# Patient Record
Sex: MALE | Race: BLACK OR AFRICAN AMERICAN | Employment: FULL TIME | URBAN - METROPOLITAN AREA
[De-identification: names, ages, dates, MRNs, and addresses within clinical notes are randomized per-mention and may not be internally consistent; named-entity substitution may affect disease eponyms.]

---

## 2017-06-13 ENCOUNTER — APPOINTMENT (OUTPATIENT)
Dept: ULTRASOUND IMAGING | Facility: HOSPITAL | Age: 32
End: 2017-06-13
Attending: EMERGENCY MEDICINE
Payer: MEDICAID

## 2017-06-13 ENCOUNTER — APPOINTMENT (OUTPATIENT)
Dept: CT IMAGING | Facility: HOSPITAL | Age: 32
End: 2017-06-13
Attending: EMERGENCY MEDICINE
Payer: MEDICAID

## 2017-06-13 ENCOUNTER — HOSPITAL ENCOUNTER (EMERGENCY)
Facility: HOSPITAL | Age: 32
Discharge: HOME | End: 2017-06-13
Attending: EMERGENCY MEDICINE
Payer: MEDICAID

## 2017-06-13 VITALS
TEMPERATURE: 98 F | SYSTOLIC BLOOD PRESSURE: 130 MMHG | HEIGHT: 72 IN | RESPIRATION RATE: 16 BRPM | DIASTOLIC BLOOD PRESSURE: 78 MMHG | OXYGEN SATURATION: 98 % | BODY MASS INDEX: 28.44 KG/M2 | WEIGHT: 210 LBS | HEART RATE: 85 BPM

## 2017-06-13 DIAGNOSIS — R20.0 NUMBNESS AND TINGLING: ICD-10-CM

## 2017-06-13 DIAGNOSIS — M79.661 RIGHT CALF PAIN: Primary | ICD-10-CM

## 2017-06-13 DIAGNOSIS — R20.2 NUMBNESS AND TINGLING: ICD-10-CM

## 2017-06-13 PROCEDURE — 99284 EMERGENCY DEPT VISIT MOD MDM: CPT

## 2017-06-13 PROCEDURE — 70450 CT HEAD/BRAIN W/O DYE: CPT | Performed by: EMERGENCY MEDICINE

## 2017-06-13 PROCEDURE — 93971 EXTREMITY STUDY: CPT | Performed by: EMERGENCY MEDICINE

## 2017-06-13 NOTE — ED PROVIDER NOTES
Patient Seen in: [facility] Emergency Department    History   Patient presents with:  Numbness Weakness (neurologic)  Deep Vein Thrombosis (cardiovascular)    Stated Complaint: Intermittent cold/ numb to right foot.    right calf pain    HPI    This distress. The patient is in no respiratory distress. The patient is not septic or toxic    HEENT: Atraumatic, conjunctiva are not pale. There is no icterus. Oral mucosa Is wet. The neck is supple. There is no meningismus.     LUNGS: Clear to auscultati Prescribed:  There are no discharge medications for this patient.

## 2017-09-05 ENCOUNTER — HOSPITAL ENCOUNTER (EMERGENCY)
Facility: HOSPITAL | Age: 32
Discharge: HOME | End: 2017-09-05
Attending: EMERGENCY MEDICINE
Payer: COMMERCIAL

## 2017-09-05 VITALS
BODY MASS INDEX: 28.44 KG/M2 | HEART RATE: 65 BPM | HEIGHT: 72 IN | RESPIRATION RATE: 16 BRPM | TEMPERATURE: 98 F | WEIGHT: 210 LBS | OXYGEN SATURATION: 99 % | SYSTOLIC BLOOD PRESSURE: 138 MMHG | DIASTOLIC BLOOD PRESSURE: 71 MMHG

## 2017-09-05 DIAGNOSIS — E86.0 DEHYDRATION: ICD-10-CM

## 2017-09-05 DIAGNOSIS — M72.2 PLANTAR FASCIITIS: Primary | ICD-10-CM

## 2017-09-05 PROCEDURE — 99283 EMERGENCY DEPT VISIT LOW MDM: CPT

## 2017-09-05 PROCEDURE — 96360 HYDRATION IV INFUSION INIT: CPT

## 2017-09-05 PROCEDURE — 99284 EMERGENCY DEPT VISIT MOD MDM: CPT

## 2017-09-05 RX ORDER — IBUPROFEN 600 MG/1
600 TABLET ORAL EVERY 8 HOURS PRN
Qty: 30 TABLET | Refills: 0 | Status: SHIPPED | OUTPATIENT
Start: 2017-09-05 | End: 2017-09-15

## 2017-09-06 NOTE — ED INITIAL ASSESSMENT (HPI)
Pt reports multiple complaints. Pt reports increased saliva production and \"spitting a lot\". +nausea. Denies emesis. Denies abd pain. Pt also reports being dehydrated. Tolerating PO fluids and solids. Denies any urinary complaints.  +light headed at times

## 2017-09-06 NOTE — ED NOTES
Pt refusing to have blood work done. Pt states he does not want to have any blood drawn, just IV fluids. MD aware.

## 2017-09-06 NOTE — ED PROVIDER NOTES
Patient Seen in: [facility] Emergency Department    History   Patient presents with: Other    Stated Complaint: dehydration     HPI    68-year-old male presents emergency department multiple complaints.   States his been having some increasing saliv meningismus no carotid bruit  CV: Regular rate and rhythm no murmur rub  Respiratory: Clear to auscultation bilaterally no crackles no wheezes no accessory muscle use  Abdomen: Soft nontender nondistended, no rebound no guarding  no hepatosplenomegaly parveen Discharge Medication List    START taking these medications    ibuprofen 600 MG Oral Tab  Take 1 tablet (600 mg total) by mouth every 8 (eight) hours as needed for Pain.   Qty: 30 tablet Refills: 0

## 2018-01-12 ENCOUNTER — APPOINTMENT (OUTPATIENT)
Dept: GENERAL RADIOLOGY | Facility: HOSPITAL | Age: 33
End: 2018-01-12
Attending: PEDIATRICS
Payer: COMMERCIAL

## 2018-01-12 ENCOUNTER — HOSPITAL ENCOUNTER (EMERGENCY)
Facility: HOSPITAL | Age: 33
Discharge: HOME | End: 2018-01-12
Attending: PEDIATRICS
Payer: COMMERCIAL

## 2018-01-12 VITALS
HEIGHT: 72 IN | WEIGHT: 200 LBS | RESPIRATION RATE: 16 BRPM | OXYGEN SATURATION: 96 % | HEART RATE: 88 BPM | SYSTOLIC BLOOD PRESSURE: 120 MMHG | DIASTOLIC BLOOD PRESSURE: 70 MMHG | TEMPERATURE: 98 F | BODY MASS INDEX: 27.09 KG/M2

## 2018-01-12 DIAGNOSIS — S60.012A CONTUSION OF LEFT THUMB WITHOUT DAMAGE TO NAIL, INITIAL ENCOUNTER: Primary | ICD-10-CM

## 2018-01-12 PROCEDURE — 99283 EMERGENCY DEPT VISIT LOW MDM: CPT

## 2018-01-12 PROCEDURE — 73140 X-RAY EXAM OF FINGER(S): CPT | Performed by: PEDIATRICS
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
## 2018-04-13 ENCOUNTER — HOSPITAL ENCOUNTER (EMERGENCY)
Facility: HOSPITAL | Age: 33
Discharge: LEFT BEFORE DISCHARGE | End: 2018-04-13
Attending: EMERGENCY MEDICINE

## 2018-04-13 ENCOUNTER — APPOINTMENT (OUTPATIENT)
Dept: GENERAL RADIOLOGY | Facility: HOSPITAL | Age: 33
End: 2018-04-13

## 2018-04-13 DIAGNOSIS — M25.512 LEFT SHOULDER PAIN, UNSPECIFIED CHRONICITY: Primary | ICD-10-CM

## 2018-04-13 PROCEDURE — 73030 X-RAY EXAM OF SHOULDER: CPT
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
## 2018-05-08 ENCOUNTER — APPOINTMENT (OUTPATIENT)
Dept: GENERAL RADIOLOGY | Facility: HOSPITAL | Age: 33
End: 2018-05-08
Attending: EMERGENCY MEDICINE

## 2018-05-08 ENCOUNTER — HOSPITAL ENCOUNTER (EMERGENCY)
Facility: HOSPITAL | Age: 33
Discharge: HOME | End: 2018-05-08
Attending: EMERGENCY MEDICINE

## 2018-05-08 VITALS
TEMPERATURE: 98 F | HEART RATE: 89 BPM | RESPIRATION RATE: 18 BRPM | HEIGHT: 71 IN | DIASTOLIC BLOOD PRESSURE: 69 MMHG | BODY MASS INDEX: 32.2 KG/M2 | WEIGHT: 230 LBS | OXYGEN SATURATION: 98 % | SYSTOLIC BLOOD PRESSURE: 131 MMHG

## 2018-05-08 DIAGNOSIS — M25.561 ACUTE PAIN OF RIGHT KNEE: Primary | ICD-10-CM

## 2018-05-08 PROCEDURE — 73560 X-RAY EXAM OF KNEE 1 OR 2: CPT | Performed by: EMERGENCY MEDICINE

## 2018-05-08 PROCEDURE — 99284 EMERGENCY DEPT VISIT MOD MDM: CPT

## 2018-05-08 PROCEDURE — 99283 EMERGENCY DEPT VISIT LOW MDM: CPT

## 2018-05-08 RX ORDER — IBUPROFEN 600 MG/1
600 TABLET ORAL ONCE
Status: COMPLETED | OUTPATIENT
Start: 2018-05-08 | End: 2018-05-08

## 2018-05-08 RX ORDER — NAPROXEN 500 MG/1
500 TABLET ORAL 2 TIMES DAILY PRN
Qty: 20 TABLET | Refills: 0 | Status: SHIPPED | OUTPATIENT
Start: 2018-05-08 | End: 2018-05-15

## 2018-05-08 NOTE — ED INITIAL ASSESSMENT (HPI)
Right knee pain x3 months. Cant walk well over the past 3 weeks pt pt. Pt unsure of how it was injured. Swelling to knee.

## 2021-05-07 ENCOUNTER — HOSPITAL ENCOUNTER (EMERGENCY)
Facility: HOSPITAL | Age: 36
Discharge: HOME | End: 2021-05-08
Attending: EMERGENCY MEDICINE
Payer: MEDICAID

## 2021-05-07 VITALS
HEART RATE: 73 BPM | OXYGEN SATURATION: 99 % | TEMPERATURE: 98 F | SYSTOLIC BLOOD PRESSURE: 134 MMHG | HEIGHT: 72 IN | RESPIRATION RATE: 18 BRPM | DIASTOLIC BLOOD PRESSURE: 88 MMHG | WEIGHT: 200 LBS | BODY MASS INDEX: 27.09 KG/M2

## 2021-05-07 DIAGNOSIS — R21 RASH OF FINGER: Primary | ICD-10-CM

## 2021-05-07 PROCEDURE — 99283 EMERGENCY DEPT VISIT LOW MDM: CPT

## 2021-05-08 NOTE — ED PROVIDER NOTES
Patient Seen in: [facility] Emergency Department      History   Patient presents with:  Rash Skin Problem    Stated Complaint: rash to right first finger    HPI/Subjective:   HPI    Is a 47-year-old male presents ED with complaints of rash to right General: No deformity. Cervical back: Normal range of motion and neck supple. Skin:     General: Skin is warm and dry. Capillary Refill: Capillary refill takes less than 2 seconds.       Comments: right hand second digit overlying the DIP joint

## 2021-06-10 ENCOUNTER — HOSPITAL ENCOUNTER (EMERGENCY)
Facility: HOSPITAL | Age: 36
Discharge: HOME | End: 2021-06-10
Attending: EMERGENCY MEDICINE
Payer: MEDICAID

## 2021-06-10 ENCOUNTER — APPOINTMENT (OUTPATIENT)
Dept: GENERAL RADIOLOGY | Facility: HOSPITAL | Age: 36
End: 2021-06-10
Attending: EMERGENCY MEDICINE
Payer: MEDICAID

## 2021-06-10 VITALS
DIASTOLIC BLOOD PRESSURE: 82 MMHG | BODY MASS INDEX: 27 KG/M2 | TEMPERATURE: 97 F | OXYGEN SATURATION: 98 % | SYSTOLIC BLOOD PRESSURE: 121 MMHG | RESPIRATION RATE: 14 BRPM | WEIGHT: 199.94 LBS | HEART RATE: 79 BPM

## 2021-06-10 DIAGNOSIS — S16.1XXA STRAIN OF NECK MUSCLE, INITIAL ENCOUNTER: Primary | ICD-10-CM

## 2021-06-10 PROCEDURE — 72050 X-RAY EXAM NECK SPINE 4/5VWS: CPT | Performed by: EMERGENCY MEDICINE

## 2021-06-10 PROCEDURE — 99284 EMERGENCY DEPT VISIT MOD MDM: CPT

## 2021-06-10 PROCEDURE — 99283 EMERGENCY DEPT VISIT LOW MDM: CPT

## 2021-06-10 PROCEDURE — 96372 THER/PROPH/DIAG INJ SC/IM: CPT

## 2021-06-10 RX ORDER — CYCLOBENZAPRINE HCL 10 MG
10 TABLET ORAL 3 TIMES DAILY PRN
Status: DISCONTINUED | OUTPATIENT
Start: 2021-06-10 | End: 2021-06-10

## 2021-06-10 RX ORDER — CYCLOBENZAPRINE HCL 10 MG
10 TABLET ORAL 3 TIMES DAILY PRN
Qty: 20 TABLET | Refills: 0 | Status: SHIPPED | OUTPATIENT
Start: 2021-06-10 | End: 2021-06-17

## 2021-06-10 RX ORDER — DICLOFENAC SODIUM 75 MG/1
75 TABLET, DELAYED RELEASE ORAL 2 TIMES DAILY
Qty: 20 TABLET | Refills: 0 | Status: SHIPPED | OUTPATIENT
Start: 2021-06-10 | End: 2021-08-04

## 2021-06-10 RX ORDER — KETOROLAC TROMETHAMINE 30 MG/ML
60 INJECTION, SOLUTION INTRAMUSCULAR; INTRAVENOUS ONCE
Status: COMPLETED | OUTPATIENT
Start: 2021-06-10 | End: 2021-06-10

## 2021-06-10 NOTE — ED INITIAL ASSESSMENT (HPI)
Pt to ed with rpts that he woke up approximately 1 week ago with pain to his left neck and shoulder. Took some tylenol over the past week without any relief.  Went to the gym this morning and when he lifted the 'bar\" he got shooting pain to left side of ne

## 2021-06-10 NOTE — ED PROVIDER NOTES
Patient Seen in: [facility] Emergency Department      History   Patient presents with:  Neck Pain    Stated Complaint: neck and back pain    HPI/Subjective:   HPI    Patient 47-year-old male coming for evaluation for left-sided neck pain.   He says h toxic-appearing. HENT:      Head: Normocephalic and atraumatic. Eyes:      General: No scleral icterus. Conjunctiva/sclera: Conjunctivae normal.   Cardiovascular:      Rate and Rhythm: Normal rate.    Pulmonary:      Effort: Pulmonary effort is norm view the neck and head are tilted to the right side about 5ø of tilt, and     although only the upper thoracic spine is     visualized, the does appear to be upper thoracic scoliosis. Oblique views     show no bony foraminal stenosis.   The C1-C2 artic

## 2021-08-04 ENCOUNTER — APPOINTMENT (OUTPATIENT)
Dept: GENERAL RADIOLOGY | Facility: HOSPITAL | Age: 36
End: 2021-08-04
Payer: MEDICAID

## 2021-08-04 PROCEDURE — 99283 EMERGENCY DEPT VISIT LOW MDM: CPT

## 2021-08-04 PROCEDURE — 73562 X-RAY EXAM OF KNEE 3: CPT

## 2021-08-05 ENCOUNTER — HOSPITAL ENCOUNTER (EMERGENCY)
Facility: HOSPITAL | Age: 36
Discharge: HOME | End: 2021-08-05
Payer: MEDICAID

## 2021-08-05 VITALS
RESPIRATION RATE: 18 BRPM | HEIGHT: 71 IN | SYSTOLIC BLOOD PRESSURE: 122 MMHG | TEMPERATURE: 97 F | BODY MASS INDEX: 29.4 KG/M2 | DIASTOLIC BLOOD PRESSURE: 80 MMHG | OXYGEN SATURATION: 95 % | HEART RATE: 67 BPM | WEIGHT: 210 LBS

## 2021-08-05 DIAGNOSIS — M25.662 KNEE STIFFNESS, LEFT: Primary | ICD-10-CM

## 2021-08-05 RX ORDER — NAPROXEN 500 MG/1
500 TABLET ORAL 2 TIMES DAILY PRN
Qty: 20 TABLET | Refills: 0 | Status: SHIPPED | OUTPATIENT
Start: 2021-08-05 | End: 2021-08-12

## 2021-08-05 RX ORDER — PANTOPRAZOLE SODIUM 40 MG/1
40 TABLET, DELAYED RELEASE ORAL DAILY
Qty: 30 TABLET | Refills: 0 | Status: SHIPPED | OUTPATIENT
Start: 2021-08-05 | End: 2021-09-04

## 2021-08-05 NOTE — ED INITIAL ASSESSMENT (HPI)
Patient here with left knee swelling and stiffness for the last 2 weeks. Patient states it is not really painful, just stiff.

## 2021-08-05 NOTE — ED PROVIDER NOTES
Patient Seen in: [facility] Emergency Department      History   Patient presents with:  Swelling Edema    Stated Complaint:  L knee stiff x weeks    HPI/Subjective:   HPI    2 weeks of left knee stiffness. No falls no injuries no trauma. No pain. no cranial nerve deficits  Skin: Skin is warm and dry. Left knee is mildly swollen compared to the right however there is no erythema or warmth no skin changes. Full range of motion without any pain.   The joint is stable and negative varus and varus va worsen          Medications Prescribed:  Discharge Medication List as of 8/5/2021  1:16 AM    START taking these medications    naproxen 500 MG Oral Tab  Take 1 tablet (500 mg total) by mouth 2 (two) times daily as needed., Normal, Disp-20 tablet, R-0    p

## 2021-09-26 ENCOUNTER — HOSPITAL ENCOUNTER (EMERGENCY)
Facility: HOSPITAL | Age: 36
Discharge: HOME | End: 2021-09-26
Attending: EMERGENCY MEDICINE
Payer: MEDICAID

## 2021-09-26 VITALS
RESPIRATION RATE: 16 BRPM | TEMPERATURE: 100 F | WEIGHT: 215 LBS | SYSTOLIC BLOOD PRESSURE: 131 MMHG | HEIGHT: 72 IN | BODY MASS INDEX: 29.12 KG/M2 | HEART RATE: 98 BPM | DIASTOLIC BLOOD PRESSURE: 78 MMHG | OXYGEN SATURATION: 96 %

## 2021-09-26 DIAGNOSIS — J01.90 ACUTE NON-RECURRENT SINUSITIS, UNSPECIFIED LOCATION: ICD-10-CM

## 2021-09-26 DIAGNOSIS — J02.9 EXUDATIVE PHARYNGITIS: Primary | ICD-10-CM

## 2021-09-26 LAB — SARS-COV-2 RNA RESP QL NAA+PROBE: NOT DETECTED

## 2021-09-26 PROCEDURE — 87430 STREP A AG IA: CPT

## 2021-09-26 PROCEDURE — 99283 EMERGENCY DEPT VISIT LOW MDM: CPT

## 2021-09-26 PROCEDURE — 87430 STREP A AG IA: CPT | Performed by: EMERGENCY MEDICINE

## 2021-09-26 RX ORDER — LIDOCAINE HYDROCHLORIDE 20 MG/ML
10 SOLUTION OROPHARYNGEAL ONCE
Status: COMPLETED | OUTPATIENT
Start: 2021-09-26 | End: 2021-09-26

## 2021-09-26 RX ORDER — AMOXICILLIN 875 MG/1
875 TABLET, COATED ORAL 2 TIMES DAILY
Qty: 20 TABLET | Refills: 0 | Status: SHIPPED | OUTPATIENT
Start: 2021-09-26 | End: 2021-10-06

## 2021-09-26 RX ORDER — LIDOCAINE HYDROCHLORIDE 20 MG/ML
5 SOLUTION OROPHARYNGEAL 4 TIMES DAILY PRN
Qty: 100 ML | Refills: 0 | Status: SHIPPED | OUTPATIENT
Start: 2021-09-26 | End: 2021-10-06

## 2021-09-26 RX ORDER — AMOXICILLIN 875 MG/1
875 TABLET, COATED ORAL ONCE
Status: COMPLETED | OUTPATIENT
Start: 2021-09-26 | End: 2021-09-26

## 2021-09-30 NOTE — ED PROVIDER NOTES
Patient Seen in: [facility] Emergency Department      History   Patient presents with:  Sore Throat  Body ache and/or chills  Ear Problem Pain    Stated Complaint: pt states, ear pain, sore throat and chills and constipation for 3 days    Subjective breath sounds, no wheezing, no rhonchi   Abdomen: Positive bowel sounds,  soft, no tenderness, no distension, no rebound, no guarding   Extremities: No cyanosis, no clubbing, no edema   Musculoskeletal: No tenderness, swelling, deformity   Skin: No signifi

## 2024-01-24 ENCOUNTER — HOSPITAL ENCOUNTER (EMERGENCY)
Facility: HOSPITAL | Age: 39
Discharge: HOME | End: 2024-01-24
Attending: STUDENT IN AN ORGANIZED HEALTH CARE EDUCATION/TRAINING PROGRAM
Payer: MEDICAID

## 2024-01-24 ENCOUNTER — APPOINTMENT (OUTPATIENT)
Dept: GENERAL RADIOLOGY | Facility: HOSPITAL | Age: 39
End: 2024-01-24
Payer: MEDICAID

## 2024-01-24 ENCOUNTER — APPOINTMENT (OUTPATIENT)
Dept: GENERAL RADIOLOGY | Facility: HOSPITAL | Age: 39
End: 2024-01-24
Attending: STUDENT IN AN ORGANIZED HEALTH CARE EDUCATION/TRAINING PROGRAM
Payer: MEDICAID

## 2024-01-24 VITALS
RESPIRATION RATE: 16 BRPM | SYSTOLIC BLOOD PRESSURE: 130 MMHG | DIASTOLIC BLOOD PRESSURE: 80 MMHG | TEMPERATURE: 98 F | WEIGHT: 210 LBS | HEART RATE: 60 BPM | HEIGHT: 72 IN | BODY MASS INDEX: 28.44 KG/M2 | OXYGEN SATURATION: 100 %

## 2024-01-24 DIAGNOSIS — M79.672 FOOT PAIN, LEFT: ICD-10-CM

## 2024-01-24 DIAGNOSIS — K61.1 PERI-RECTAL ABSCESS: Primary | ICD-10-CM

## 2024-01-24 PROCEDURE — 46050 I&D PERIANAL ABSCESS SUPFC: CPT

## 2024-01-24 PROCEDURE — 99284 EMERGENCY DEPT VISIT MOD MDM: CPT

## 2024-01-24 PROCEDURE — 73630 X-RAY EXAM OF FOOT: CPT | Performed by: STUDENT IN AN ORGANIZED HEALTH CARE EDUCATION/TRAINING PROGRAM

## 2024-01-24 PROCEDURE — 73562 X-RAY EXAM OF KNEE 3: CPT

## 2024-01-24 NOTE — ED INITIAL ASSESSMENT (HPI)
To the ED with c/o swelling nad pain to his right knee, abscess to left foot, and abscess on the right side of buttocks. States he has been exp this for approx 4-5 months, 3 months for buttock, and knee swelling for one year. States he will need a referral for follow up.

## 2024-01-26 NOTE — ED PROVIDER NOTES
Patient Seen in: [facility] Emergency Department      History     Chief Complaint   Patient presents with    Abscess     Stated Complaint: 2 ABSCESSES, ONE ON FOOT AND ONE ON BUTTOCK AND RIGHT KNEE SWELLING    Subjective:   HPI    The patient is a 38-year-old male presenting with multiple complaints that have been ongoing for at least 3 to 6 months each.  Patient states first he has right knee pain which she has had for 6 months.  He denies any trauma but states he first noticed it when he was working out.  He has been able to ambulate without difficulty.  He denies any redness or restricted movement.    Patient's second chief complaint is concern of an abscess in his perirectal region.  He tells me that he does shave this area and he has had a painful bump there for about 3 months.  He has had no fevers or chills nausea or vomiting and denies any history of constipation.  He does not believe it is a hemorrhoid.    Apparently the patient reports pain in the bottom of his right foot near the base of his second and third metatarsal.  This has been causing him discomfort for months.  He denies any drainage from the site.  Is the patient's first time seeking care for any of these issues.    Objective:   History reviewed. No pertinent past medical history.           History reviewed. No pertinent surgical history.             Social History     Socioeconomic History    Marital status: Single   Tobacco Use    Smoking status: Never    Smokeless tobacco: Never   Vaping Use    Vaping Use: Never used   Substance and Sexual Activity    Alcohol use: Not Currently    Drug use: Not Currently              Review of Systems    Positive for stated complaint: 2 ABSCESSES, ONE ON FOOT AND ONE ON BUTTOCK AND RIGHT KNEE SWELLING  Other systems are as noted in HPI.  Constitutional and vital signs reviewed.      All other systems reviewed and negative except as noted above.    Physical Exam     ED Triage Vitals [01/24/24 1620]   BP  144/78   Pulse 78   Resp 16   Temp 97.8 °F (36.6 °C)   Temp src Temporal   SpO2 96 %   O2 Device None (Room air)       Current:/80   Pulse 60   Temp 97.8 °F (36.6 °C) (Temporal)   Resp 16   Ht 182.9 cm (6')   Wt 95.3 kg   SpO2 100%   BMI 28.48 kg/m²         Physical Exam  Vitals and nursing note reviewed. Exam conducted with a chaperone present.   Constitutional:       General: He is not in acute distress.     Appearance: Normal appearance.   HENT:      Head: Normocephalic.      Nose: Nose normal.      Mouth/Throat:      Mouth: Mucous membranes are moist.   Eyes:      Extraocular Movements: Extraocular movements intact.      Pupils: Pupils are equal, round, and reactive to light.   Cardiovascular:      Rate and Rhythm: Normal rate and regular rhythm.      Pulses: Normal pulses.   Pulmonary:      Effort: Pulmonary effort is normal.   Abdominal:      General: Abdomen is flat. Bowel sounds are normal. There is no distension.      Palpations: Abdomen is soft.      Tenderness: There is no abdominal tenderness. There is no right CVA tenderness, left CVA tenderness, guarding or rebound.      Hernia: No hernia is present.   Genitourinary:     Comments: Appears to be a small 2 cm in diameter abscess and the left DOROTHY rectal region that is minimally fluctuant with surrounding induration  Musculoskeletal:         General: No swelling or tenderness. Normal range of motion.      Cervical back: Normal range of motion.      Comments: Tenderness to the right knee but there is no palpable fluid collection, increased warmth or restriction of motion.    Patient with dry keratotic skin on the plantar surfaces of bilateral feet.  On the left foot there are calluses near the MCP region.  1 is significantly more tender which is located at the base of the third toe but there is no palpable fluctuance.  No obvious foreign body palpable.  2+ pedal pulses bilaterally   Skin:     General: Skin is warm and dry.   Neurological:       Mental Status: He is alert and oriented to person, place, and time. Mental status is at baseline.   Psychiatric:         Mood and Affect: Mood normal.               ED Course   Labs Reviewed - No data to display  XR FOOT, COMPLETE (MIN 3 VIEWS), LEFT (CPT=73630)    Result Date: 1/24/2024  CONCLUSION:  No evidence of acute displaced fracture or dislocation in the left foot.  No evidence of retained radiopaque foreign body.  LOCATION:  [id number]   Dictated by (CST): Orlando Hatfield MD on 1/24/2024 at 9:34 PM     Finalized by (CST): Orlando Hatfield MD on 1/24/2024 at 9:35 PM       XR KNEE (3 VIEWS), RIGHT (CPT=73562)    Result Date: 1/24/2024  CONCLUSION:  No acute osseous abnormality identified.   LOCATION:  [facility]   Dictated by (CST): Gabriel Mueller MD on 1/24/2024 at 7:16 PM     Finalized by (CST): Gabriel Mueller MD on 1/24/2024 at 7:17 PM              incision and drainage of 2 cm wide abscess  Site of abscess cleaned with chlorhexidine  3 cc of lidocaine w/o epi used for local anesthesia. 1 cm linear incision through center of abscess made using a #11 blade.  Approximately 3 cc purulent drainage expressed.  Dede clamps used to probe and break up loculations which resulted in further drainage expression. 1 inch iodoform used to pack cavity. Pt tolerated procedure well.  MDM        Medical Decision Making  Differential for the patient's presenting symptoms includes perirectal abscess which could be secondary to ingrown hair versus carbuncle.    She did have an abscess which was incised and drained.  Area too small to place the packing.    Patient tolerated the procedure well.    Patient's extremities neurovascularly intact and he has full range of motion without signs concerning for septic joint or abscesses.  X-rays were obtained.  My independent interpretation of the x-ray of the right knee shows that there is no evidence of fracture or dislocation or effusion.  Patient left before the formal read of his  x-ray of his foot but there is no evidence of foreign body.    I have provided the patient with a referral to a PCP that he can follow-up with.    Disposition and Plan     Clinical Impression:  1. Lolis-rectal abscess    2. Foot pain, left         Disposition:  Discharge  1/24/2024  9:14 pm    Follow-up:  Melanie Christianson MD  [address] [facility] [address]  [phone]    Follow up            Medications Prescribed:  There are no discharge medications for this patient.

## 2024-02-02 ENCOUNTER — HOSPITAL ENCOUNTER (EMERGENCY)
Facility: HOSPITAL | Age: 39
Discharge: HOME | End: 2024-02-02
Attending: EMERGENCY MEDICINE
Payer: MEDICAID

## 2024-02-02 VITALS
HEART RATE: 90 BPM | OXYGEN SATURATION: 99 % | RESPIRATION RATE: 16 BRPM | HEIGHT: 72 IN | TEMPERATURE: 99 F | BODY MASS INDEX: 28.44 KG/M2 | DIASTOLIC BLOOD PRESSURE: 80 MMHG | SYSTOLIC BLOOD PRESSURE: 117 MMHG | WEIGHT: 210 LBS

## 2024-02-02 DIAGNOSIS — Z51.89 WOUND CHECK, ABSCESS: Primary | ICD-10-CM

## 2024-02-02 DIAGNOSIS — M79.672 LEFT FOOT PAIN: ICD-10-CM

## 2024-02-02 DIAGNOSIS — Z71.1 CONCERN ABOUT STI IN MALE WITHOUT DIAGNOSIS: ICD-10-CM

## 2024-02-02 PROCEDURE — 87591 N.GONORRHOEAE DNA AMP PROB: CPT | Performed by: PHYSICIAN ASSISTANT

## 2024-02-02 PROCEDURE — 99284 EMERGENCY DEPT VISIT MOD MDM: CPT

## 2024-02-02 PROCEDURE — 99283 EMERGENCY DEPT VISIT LOW MDM: CPT

## 2024-02-02 PROCEDURE — 87491 CHLMYD TRACH DNA AMP PROBE: CPT | Performed by: PHYSICIAN ASSISTANT

## 2024-02-02 PROCEDURE — 96372 THER/PROPH/DIAG INJ SC/IM: CPT

## 2024-02-02 RX ORDER — DOXYCYCLINE HYCLATE 100 MG/1
100 CAPSULE ORAL 2 TIMES DAILY
Qty: 14 CAPSULE | Refills: 0 | Status: SHIPPED | OUTPATIENT
Start: 2024-02-02 | End: 2024-02-09

## 2024-02-02 RX ORDER — NAPROXEN 500 MG/1
500 TABLET ORAL 2 TIMES DAILY PRN
Qty: 20 TABLET | Refills: 0 | Status: SHIPPED | OUTPATIENT
Start: 2024-02-02 | End: 2024-02-09

## 2024-02-02 NOTE — ED PROVIDER NOTES
Patient Seen in: [facility] Emergency Department      History     Chief Complaint   Patient presents with    Abscess    Eval-G     Stated Complaint: wants abscess rechecked and an STD check    Subjective:   HPI    38-year-old gentleman.  Patient was evaluated in the emergency department 9 days prior to arrival.  At that time he was noted to have a perirectal abscess.  A incision and drainage was performed.  Patient admits that he eloped from the ER before final disposition.  Packing was not placed.  He was not put on oral antibiotics.  He has been completing warm soaks since that time and overall, does feel improved.  He has had no recent drainage or bleeding from the site.  Denies any systemic fever, chills or malaise.  He arrives for wound check    Secondarily, patient is concerned for possible STI exposure.  He was recently informed by his girlfriend that she has some type of STI.  He is unsure of the details.  He denies any penile discharge.  No rash or lesions.  No testicular pain.  No groin discomfort    Patient also complaining of continued left foot pain.  He was evaluated for this complaint at his previous ER visit and x-rays were performed.  As he eloped, he did not receive final discharge instructions or referrals.  We discussed the need for possible orthopedic versus podiatry follow-up.    Objective:   History reviewed. No pertinent past medical history.           History reviewed. No pertinent surgical history.             Social History     Socioeconomic History    Marital status: Single   Tobacco Use    Smoking status: Never    Smokeless tobacco: Never   Vaping Use    Vaping Use: Never used   Substance and Sexual Activity    Alcohol use: Yes     Comment: socially    Drug use: Yes     Types: Cannabis     Comment: daily              Review of Systems    Positive for stated complaint: wants abscess rechecked and an STD check  Other systems are as noted in HPI.  Constitutional and vital signs  reviewed.      All other systems reviewed and negative except as noted above.    Physical Exam     ED Triage Vitals [02/02/24 1212]   /79   Pulse 99   Resp 18   Temp 98.8 °F (37.1 °C)   Temp src Temporal   SpO2 99 %   O2 Device None (Room air)       Current:/80   Pulse 90   Temp 98.8 °F (37.1 °C) (Temporal)   Resp 16   Ht 182.9 cm (6')   Wt 95.3 kg   SpO2 99%   BMI 28.48 kg/m²         Physical Exam    Gen: Well appearing, well groomed, alert and aware x 3  Lung: No distress, RR, no retraction  Extremities: Full ROM, no deformity, NVI  Rectal: At the 6 o'clock position, with at least 1 cm of space between the rectum and the lesion there is evidence of a previous incision and drainage.  The area is granulated and healing well.  There is a subtle amount of remaining induration.  With manipulation, I was able to express a small amount of pus.  No significant fluctuance.  No significant pain  Urogenital: Normal scrotal contents.  No pain to palpation of the bilateral epididymal regions.  No rash or lesions noted.  No obvious discharge.  No inguinal lymphadenopathy  Neuro:  Normal Gait    ED Course     Labs Reviewed   CHLAMYDIA/GONOCOCCUS, AMRIT                    MDM      My supervising physician was involved in the management of this patient.    With a female nursing chaperone both a rectal and urogenital exam were performed    Patient will receive an empiric 500 mg intramuscular Rocephin for potential GC/chlamydia.  Will be discharged with doxycycline.  Urine sample sent with pending culture.  We discussed having no intercourse until results are returned.  If positive, he should wait at least 10 additional days before resuming intercourse.  All partners should be treated.  If he is interested in HIV testing he may follow-up with the health department.    The perirectal region seems to be healing quite nicely.  He will also be now on new oral antibiotics.  May return for worsening      Patient's  previous x-ray was reviewed.  No acute bony abnormalities.  Will plan on referring the patient to podiatry.  Start naproxen.                         Medical Decision Making      Disposition and Plan     Clinical Impression:  1. Wound check, abscess    2. Left foot pain    3. Concern about STI in male without diagnosis         Disposition:  Discharge  2/2/2024  1:40 pm    Follow-up:  Jennifer Lopez, DPM  [address] [place] [address]  [phone]    Follow up            Medications Prescribed:  Discharge Medication List as of 2/2/2024  1:43 PM        START taking these medications    Details   naproxen 500 MG Oral Tab Take 1 tablet (500 mg total) by mouth 2 (two) times daily as needed., Normal, Disp-20 tablet, R-0      doxycycline 100 MG Oral Cap Take 1 capsule (100 mg total) by mouth 2 (two) times daily for 7 days., Normal, Disp-14 capsule, R-0

## 2024-02-02 NOTE — ED INITIAL ASSESSMENT (HPI)
Pt to ER ambulatory with steady gait with c/o abscess to left foot. Pt wants this to be recheck. Pt states \"5 days ago he came here, had x-ray but still feeling pressure.\"   Pt also had abscess to his butt drained 5 days ago, pt requesting for this to also be recheck because the incision is not closed.  Denies fevers.    Pt also requesting STD check.

## 2024-02-02 NOTE — ED PROVIDER NOTES
I have personally reviewed the case with the PA as well as completed a HPI and agree with the care and treatment plan put forth. I provided  a substansive portion of care for this patient.  I personally performed tthe medical decision making for this encounter.    HEENT; NCAT, EOMI, throat clear, neck supple, no LAD, no JVD  Heart S1S2 RRR  lungs: CTAB  abd: Soft NT, ND,  NABS without rebound or guarding  Ext no C/C/E   within normal limits without discharge or tenderness noted  Rectal exam per PA    Differential diagnosis does include STD.  Abscess seems improved.  STD studies were sent to the lab with the patient was empirically treated be discharged home for the outpatient management    Differential diagnosis does include STD but not limited such.  Patient septum seems improved.  Patient is treated for STD with known exposure and send patient be discharged home antibiotics and follow-up.    Medications   cefTRIAXone (Rocephin) 500 mg in lidocaine PF (Xylocaine-MPF) 1 % IM syringe (500 mg Intramuscular Given 2/2/24 1912)

## 2024-02-05 LAB
C TRACH DNA SPEC QL NAA+PROBE: NEGATIVE
N GONORRHOEA DNA SPEC QL NAA+PROBE: NEGATIVE

## (undated) NOTE — ED AVS SNAPSHOT
Cleo García   MRN: [id number]    Department:  [facility] Emergency Department   Date of Visit:  5/8/2018           Disclosure     Insurance plans vary and the physician(s) referred by the ER may not be covered by your plan.  Please contact your i tell this physician (or your personal doctor if your instructions are to return to your personal doctor) about any new or lasting problems. The primary care or specialist physician will see patients referred from the [facility] Emergency Department.  Cheryl [place]

## (undated) NOTE — LETTER
Date & Time: 6/10/2021, 12:12 PM  Patient: Louise Landau Bragg  Encounter Provider(s):    Austen Avila MD       To Whom It May Concern:    Louise Landau Bragg was seen and treated in our department on 6/10/2021.  He 06/14/2021    If you have any questions or roger

## (undated) NOTE — ED AVS SNAPSHOT
Nanda Nascimento   MRN: [id number]    Department:  [facility] Emergency Department   Date of Visit:  1/12/2018           Disclosure     Insurance plans vary and the physician(s) referred by the ER may not be covered by your plan.  Please contact your tell this physician (or your personal doctor if your instructions are to return to your personal doctor) about any new or lasting problems. The primary care or specialist physician will see patients referred from the [facility] Emergency Department.  Cheryle Levins

## (undated) NOTE — LETTER
Date & Time: 9/26/2021, 7:02 AM  Patient: Miladis Braun  Encounter Provider(s):    Pretty Knight MD       To Whom It May Concern:    Miladis Braun was seen and treated in our department on 9/26/2021.  He should not return to work until symptoms improv

## (undated) NOTE — ED AVS SNAPSHOT
[facility] Emergency Department    [place]  [address] [address]    Phone:  [phone]    Fax:  [phone]           Nori Harleyamanda   MRN: [id number]    Department:  [facility] Emergency Department   Date of Visit:  6[facility]13/2 [facility] [phone]    Expect to receive an electronic request (by e-mail or text) to complete a self-assessment the day after your visit. You may also receive a call from our patient liason soon after your visit.  Also, some p [facility] [address] ([address]) [address]  ([address]) [address] [facility]. ([address]) [address] (8 INDICATIONS:  Intermittent discomfort of the right calf, eval for DVT     TECHNIQUE:  Real time, grey scale, and duplex ultrasound was used to evaluate the lower extremity venous system.  B-mode two-dimensional images of the vascular structures, Doppler spe There is no focal parenchymal attenuation abnormality. There is no evident fracture. The visualized paranasal sinuses and mastoid air cells are unremarkable. MyChart     Sign up for [facility], your secure online medical record.   MyChart

## (undated) NOTE — ED AVS SNAPSHOT
[facility] Emergency Department    [place]  [address] [address]    Phone:  [phone]    Fax:  [phone]           Cedar Sangeetha   MRN: [id number]    Department:  [facility] Emergency Department   Date of Visit:  6/13/2 IF THERE IS ANY CHANGE OR WORSENING OF YOUR CONDITION, CALL YOUR PRIMARY CARE PHYSICIAN AT ONCE OR RETURN IMMEDIATELY TO THE EMERGENCY DEPARTMENT.     If you have been prescribed any medication(s), please fill your prescription right away and begin taking t

## (undated) NOTE — LETTER
Date & Time: 6/10/2021, 12:14 PM  Patient: Alanis Braun  Encounter Provider(s):    John Jorge MD       To Whom It May Concern:    Alanis Braun was seen and treated in our department on 6/10/2021. He is excused from work for 3 days.      If you have

## (undated) NOTE — LETTER
Date & Time: 9/26/2021, 7:43 AM  Patient: Lissa Braun  Encounter Provider(s):    Sara Flaherty MD       To Whom It May Concern:    Lissa Braun was seen and treated in our department on 9/26/2021. He may return to work on September 9.29.2021.     I

## (undated) NOTE — ED AVS SNAPSHOT
Sterling Johnston   MRN: [id number]    Department:  [facility] Emergency Department   Date of Visit:  9/5/2017           Disclosure     Insurance plans vary and the physician(s) referred by the ER may not be covered by your plan.  Please contact your i If you have been prescribed any medication(s), please fill your prescription right away and begin taking the medication(s) as directed    If the emergency physician has read X-rays, these will be re-interpreted by a radiologist.  If there is a significant

## (undated) NOTE — LETTER
Date & Time: 9/26/2021, 7:45 AM  Patient: Gisela Braun  Encounter Provider(s):    Maryalice Habermann, MD       To Whom It May Concern:    Gisela Braun was seen and treated in our department on 9/26/2021.  He may return to work on Wednesday, September 29,